# Patient Record
Sex: FEMALE | Race: WHITE | URBAN - METROPOLITAN AREA
[De-identification: names, ages, dates, MRNs, and addresses within clinical notes are randomized per-mention and may not be internally consistent; named-entity substitution may affect disease eponyms.]

---

## 2021-01-18 ENCOUNTER — PREPPED CHART (OUTPATIENT)
Dept: URBAN - METROPOLITAN AREA CLINIC 101 | Facility: CLINIC | Age: 76
End: 2021-01-18

## 2021-01-18 PROBLEM — E11.3293 MILD NONPROLIFERATIVE DIABETIC RETINOPATHY: Noted: 2021-01-18

## 2021-01-18 PROBLEM — H35.373 EPIRETINAL MEMBRANE: Noted: 2021-01-18

## 2021-01-18 PROBLEM — H35.343 MACULAR HOLE: Status: RESOLVED | Noted: 2021-01-18

## 2021-01-18 PROBLEM — H25.11 NS CATARACT: Noted: 2021-01-18

## 2021-01-18 PROBLEM — H35.342 MACULAR HOLE: Noted: 2021-01-18

## 2021-01-18 PROBLEM — H35.343 MACULAR HOLE: Status: WELL-CONTROLLED | Noted: 2021-01-18

## 2021-01-18 PROBLEM — H35.343 MACULAR HOLE: Noted: 2021-01-18

## 2021-07-22 ENCOUNTER — APPOINTMENT (RX ONLY)
Dept: URBAN - METROPOLITAN AREA CLINIC 37 | Facility: CLINIC | Age: 76
Setting detail: DERMATOLOGY
End: 2021-07-22

## 2021-07-22 DIAGNOSIS — L20.89 OTHER ATOPIC DERMATITIS: ICD-10-CM

## 2021-07-22 DIAGNOSIS — L21.8 OTHER SEBORRHEIC DERMATITIS: ICD-10-CM

## 2021-07-22 PROBLEM — L20.84 INTRINSIC (ALLERGIC) ECZEMA: Status: ACTIVE | Noted: 2021-07-22

## 2021-07-22 PROCEDURE — 99203 OFFICE O/P NEW LOW 30 MIN: CPT

## 2021-07-22 PROCEDURE — ? ORDER TESTS

## 2021-07-22 PROCEDURE — ? ADDITIONAL NOTES

## 2021-07-22 PROCEDURE — ? COUNSELING

## 2021-07-22 PROCEDURE — ? PRESCRIPTION

## 2021-07-22 RX ORDER — DESOXIMETASONE 2.5 MG/G
OINTMENT TOPICAL
Qty: 1 | Refills: 2 | Status: ERX | COMMUNITY
Start: 2021-07-22

## 2021-07-22 RX ORDER — FLUOCINONIDE 0.5 MG/ML
SOLUTION TOPICAL
Qty: 1 | Refills: 3 | Status: ERX | COMMUNITY
Start: 2021-07-22

## 2021-07-22 RX ADMIN — DESOXIMETASONE: 2.5 OINTMENT TOPICAL at 00:00

## 2021-07-22 RX ADMIN — FLUOCINONIDE: 0.5 SOLUTION TOPICAL at 00:00

## 2021-07-22 ASSESSMENT — LOCATION SIMPLE DESCRIPTION DERM
LOCATION SIMPLE: LEFT HAND
LOCATION SIMPLE: SCALP
LOCATION SIMPLE: RIGHT HAND

## 2021-07-22 ASSESSMENT — LOCATION ZONE DERM
LOCATION ZONE: HAND
LOCATION ZONE: SCALP

## 2021-07-22 ASSESSMENT — LOCATION DETAILED DESCRIPTION DERM
LOCATION DETAILED: RIGHT RADIAL DORSAL HAND
LOCATION DETAILED: LEFT RADIAL DORSAL HAND
LOCATION DETAILED: RIGHT SUPERIOR PARIETAL SCALP

## 2021-07-22 NOTE — PROCEDURE: ORDER TESTS
Billing Type: Third-Party Bill
Expected Date Of Service: 07/22/2021
Bill For Surgical Tray: no
Performing Laboratory: 0

## 2021-07-22 NOTE — HPI: RASH ([email or web address])
How Severe Is Your Eczema?: moderate
Is This A New Presentation, Or A Follow-Up?: Follow Up Eczema
Additional History: Patient states Clobetasol ointment did not work well. Patient would like to inquire about an alternative that can be prescribed.

## 2021-07-23 RX ORDER — DESOXIMETASONE 2.5 MG/G
OINTMENT TOPICAL
Qty: 60 | Refills: 2 | Status: ERX

## 2021-08-24 ENCOUNTER — RX ONLY (OUTPATIENT)
Age: 76
Setting detail: RX ONLY
End: 2021-08-24

## 2021-08-24 RX ORDER — PREDNISONE 10 MG/1
TABLET ORAL
Qty: 30 | Refills: 0 | Status: ERX | COMMUNITY
Start: 2021-08-24

## 2022-05-20 ASSESSMENT — VISUAL ACUITY
OS_PH: 20/70-2
OS_CC: 20/150-1
OD_CC: 20/60
OD_PH: 20/30-1

## 2022-05-20 ASSESSMENT — TONOMETRY
OD_IOP_MMHG: 14
OS_IOP_MMHG: 16

## 2022-05-23 ENCOUNTER — 1 YEAR COMPLETE EXAM (OUTPATIENT)
Dept: URBAN - METROPOLITAN AREA CLINIC 101 | Facility: CLINIC | Age: 77
End: 2022-05-23

## 2022-05-23 DIAGNOSIS — H25.11: ICD-10-CM

## 2022-05-23 DIAGNOSIS — E11.3293: ICD-10-CM

## 2022-05-23 DIAGNOSIS — H35.343: ICD-10-CM

## 2022-05-23 DIAGNOSIS — H35.373: ICD-10-CM

## 2022-05-23 PROCEDURE — 92014 COMPRE OPH EXAM EST PT 1/>: CPT

## 2022-05-23 PROCEDURE — 92202 OPSCPY EXTND ON/MAC DRAW: CPT

## 2022-05-23 PROCEDURE — 92134 CPTRZ OPH DX IMG PST SGM RTA: CPT

## 2022-05-23 ASSESSMENT — VISUAL ACUITY
OD_SC: 20/60-2
OS_SC: 20/60-3

## 2022-05-23 ASSESSMENT — TONOMETRY
OD_IOP_MMHG: 15
OS_IOP_MMHG: 15

## 2022-06-17 ENCOUNTER — SURGERY/PROCEDURE (OUTPATIENT)
Dept: URBAN - METROPOLITAN AREA SURGICAL CENTER 41 | Facility: SURGICAL CENTER | Age: 77
End: 2022-06-17

## 2022-06-17 DIAGNOSIS — H35.343: ICD-10-CM

## 2022-06-17 PROCEDURE — 67042 VIT FOR MACULAR HOLE: CPT

## 2022-06-18 ENCOUNTER — 1 DAY POST-OP (OUTPATIENT)
Dept: URBAN - METROPOLITAN AREA CLINIC 113 | Facility: CLINIC | Age: 77
End: 2022-06-18

## 2022-06-18 DIAGNOSIS — E11.3293: ICD-10-CM

## 2022-06-18 DIAGNOSIS — H25.11: ICD-10-CM

## 2022-06-18 DIAGNOSIS — H35.373: ICD-10-CM

## 2022-06-18 DIAGNOSIS — H35.343: ICD-10-CM

## 2022-06-18 PROCEDURE — 99024 POSTOP FOLLOW-UP VISIT: CPT

## 2022-06-18 ASSESSMENT — VISUAL ACUITY
OS_SC: 20/70-2
OS_PH: 20/60
OD_SC: CF 1FT

## 2022-06-18 ASSESSMENT — TONOMETRY
OD_IOP_MMHG: 11
OS_IOP_MMHG: 14

## 2022-06-24 ENCOUNTER — 1 WEEK POST-OP (OUTPATIENT)
Dept: URBAN - METROPOLITAN AREA CLINIC 101 | Facility: CLINIC | Age: 77
End: 2022-06-24

## 2022-06-24 DIAGNOSIS — Z98.890: ICD-10-CM

## 2022-06-24 DIAGNOSIS — H35.343: ICD-10-CM

## 2022-06-24 PROCEDURE — 99024 POSTOP FOLLOW-UP VISIT: CPT

## 2022-06-24 ASSESSMENT — VISUAL ACUITY
OD_SC: CF 5FT
OS_PH: 20/60-2
OS_SC: 20/70-2
OD_PH: 20/400

## 2022-06-24 ASSESSMENT — TONOMETRY: OD_IOP_MMHG: 22

## 2022-07-08 ENCOUNTER — POST-OP CHECK (OUTPATIENT)
Dept: URBAN - METROPOLITAN AREA CLINIC 101 | Facility: CLINIC | Age: 77
End: 2022-07-08

## 2022-07-08 DIAGNOSIS — H35.343: ICD-10-CM

## 2022-07-08 DIAGNOSIS — Z98.890: ICD-10-CM

## 2022-07-08 PROCEDURE — 99024 POSTOP FOLLOW-UP VISIT: CPT

## 2022-07-08 ASSESSMENT — VISUAL ACUITY
OS_SC: 20/80-1
OD_SC: 20/300

## 2022-07-08 ASSESSMENT — TONOMETRY
OD_IOP_MMHG: 14
OS_IOP_MMHG: 16

## 2022-08-11 ENCOUNTER — POST-OP CHECK (OUTPATIENT)
Dept: URBAN - METROPOLITAN AREA CLINIC 101 | Facility: CLINIC | Age: 77
End: 2022-08-11

## 2022-08-11 DIAGNOSIS — H35.343: ICD-10-CM

## 2022-08-11 DIAGNOSIS — Z98.890: ICD-10-CM

## 2022-08-11 PROCEDURE — 99024 POSTOP FOLLOW-UP VISIT: CPT

## 2022-08-11 PROCEDURE — 92134 CPTRZ OPH DX IMG PST SGM RTA: CPT

## 2022-08-11 ASSESSMENT — VISUAL ACUITY
OD_SC: 20/40
OS_SC: 20/80+1

## 2022-08-11 ASSESSMENT — TONOMETRY
OS_IOP_MMHG: 15
OD_IOP_MMHG: 14

## 2022-11-28 ENCOUNTER — FOLLOW UP (OUTPATIENT)
Dept: URBAN - METROPOLITAN AREA CLINIC 101 | Facility: CLINIC | Age: 77
End: 2022-11-28

## 2022-11-28 DIAGNOSIS — Z98.890: ICD-10-CM

## 2022-11-28 DIAGNOSIS — H35.373: ICD-10-CM

## 2022-11-28 DIAGNOSIS — H35.343: ICD-10-CM

## 2022-11-28 DIAGNOSIS — E11.3293: ICD-10-CM

## 2022-11-28 DIAGNOSIS — Z79.899: ICD-10-CM

## 2022-11-28 PROCEDURE — 92014 COMPRE OPH EXAM EST PT 1/>: CPT

## 2022-11-28 PROCEDURE — 92202 OPSCPY EXTND ON/MAC DRAW: CPT

## 2022-11-28 PROCEDURE — 92134 CPTRZ OPH DX IMG PST SGM RTA: CPT

## 2022-11-28 ASSESSMENT — TONOMETRY
OD_IOP_MMHG: 13
OS_IOP_MMHG: 13

## 2022-11-28 ASSESSMENT — VISUAL ACUITY
OD_SC: 20/50
OS_SC: 20/70-3

## 2023-08-10 ENCOUNTER — 6 MONTH FOLLOW UP (OUTPATIENT)
Dept: URBAN - METROPOLITAN AREA CLINIC 101 | Facility: CLINIC | Age: 78
End: 2023-08-10

## 2023-08-10 DIAGNOSIS — H35.343: ICD-10-CM

## 2023-08-10 DIAGNOSIS — E11.3293: ICD-10-CM

## 2023-08-10 DIAGNOSIS — H35.372: ICD-10-CM

## 2023-08-10 DIAGNOSIS — Z79.899: ICD-10-CM

## 2023-08-10 PROCEDURE — 92202 OPSCPY EXTND ON/MAC DRAW: CPT

## 2023-08-10 PROCEDURE — 92134 CPTRZ OPH DX IMG PST SGM RTA: CPT

## 2023-08-10 PROCEDURE — 92014 COMPRE OPH EXAM EST PT 1/>: CPT

## 2023-08-10 ASSESSMENT — VISUAL ACUITY
OS_SC: 20/70+2
OD_SC: 20/50+2

## 2023-08-10 ASSESSMENT — TONOMETRY
OS_IOP_MMHG: 17
OD_IOP_MMHG: 16

## (undated) RX ORDER — PREDNISOLONE ACETATE 10 MG/ML: 1 SUSPENSION/ DROPS OPHTHALMIC

## (undated) RX ORDER — OFLOXACIN 3 MG/ML: 1 SOLUTION OPHTHALMIC